# Patient Record
Sex: FEMALE | Race: BLACK OR AFRICAN AMERICAN | URBAN - METROPOLITAN AREA
[De-identification: names, ages, dates, MRNs, and addresses within clinical notes are randomized per-mention and may not be internally consistent; named-entity substitution may affect disease eponyms.]

---

## 2020-08-19 ENCOUNTER — APPOINTMENT (RX ONLY)
Dept: URBAN - METROPOLITAN AREA CLINIC 110 | Facility: CLINIC | Age: 65
Setting detail: DERMATOLOGY
End: 2020-08-19

## 2020-08-19 DIAGNOSIS — L21.8 OTHER SEBORRHEIC DERMATITIS: ICD-10-CM

## 2020-08-19 DIAGNOSIS — L30.9 DERMATITIS, UNSPECIFIED: ICD-10-CM

## 2020-08-19 PROCEDURE — ? COUNSELING

## 2020-08-19 PROCEDURE — 99202 OFFICE O/P NEW SF 15 MIN: CPT

## 2020-08-19 PROCEDURE — ? PRESCRIPTION

## 2020-08-19 RX ORDER — HYDROCORTISONE 25 MG/G
CREAM TOPICAL BID
Qty: 1 | Refills: 5 | Status: ERX | COMMUNITY
Start: 2020-08-19

## 2020-08-19 RX ORDER — KETOCONAZOLE 20 MG/ML
SHAMPOO, SUSPENSION TOPICAL TIW
Qty: 1 | Refills: 6 | Status: ERX | COMMUNITY
Start: 2020-08-19

## 2020-08-19 RX ADMIN — HYDROCORTISONE: 25 CREAM TOPICAL at 00:00

## 2020-08-19 RX ADMIN — KETOCONAZOLE: 20 SHAMPOO, SUSPENSION TOPICAL at 00:00

## 2020-08-19 ASSESSMENT — LOCATION SIMPLE DESCRIPTION DERM
LOCATION SIMPLE: SCALP
LOCATION SIMPLE: CHIN
LOCATION SIMPLE: LEFT ANTERIOR NECK
LOCATION SIMPLE: LEFT ANTERIOR NECK
LOCATION SIMPLE: SCALP

## 2020-08-19 ASSESSMENT — LOCATION ZONE DERM
LOCATION ZONE: SCALP
LOCATION ZONE: NECK
LOCATION ZONE: SCALP
LOCATION ZONE: FACE
LOCATION ZONE: NECK

## 2020-08-19 ASSESSMENT — LOCATION DETAILED DESCRIPTION DERM
LOCATION DETAILED: LEFT MENTAL CREASE
LOCATION DETAILED: LEFT INFERIOR ANTERIOR NECK
LOCATION DETAILED: RIGHT SUPERIOR PARIETAL SCALP
LOCATION DETAILED: LEFT INFERIOR ANTERIOR NECK
LOCATION DETAILED: LEFT SUPERIOR PARIETAL SCALP

## 2020-08-19 NOTE — PROCEDURE: MIPS QUALITY
Quality 127: Diabetic Foot And Ankle Care, Ulcer Prevention - Evaluation Of Footwear: Footwear Evaluation not Performed
Quality 130: Documentation Of Current Medications In The Medical Record: Current Medications Documented
Detail Level: Detailed
Quality 402: Tobacco Use And Help With Quitting Among Adolescents: Patient screened for tobacco and never smoked
Quality 431: Preventive Care And Screening: Unhealthy Alcohol Use - Screening: Patient screened for unhealthy alcohol use using a single question and scores less than 2 times per year
Quality 1: Diabetes Hemoglobin A1c Poor Control: Hemoglobin A1C was not recorded
Quality 126: Diabetes Mellitus: Diabetic Foot And Ankle Care, Peripheral Neuropathy - Neurological Evaluation: Lower Extremity Neurological Exam not Performed
Quality 226: Preventive Care And Screening: Tobacco Use: Screening And Cessation Intervention: Patient screened for tobacco use and is an ex/non-smoker

## 2020-08-19 NOTE — PROCEDURE: COUNSELING
Detail Level: Detailed
Patient Specific Counseling (Will Not Stick From Patient To Patient): .\\nWell-demarcated erythema and edema of the anterior neck and chin with associated itchiness. Reports beginning abruptly one week prior. Denies any new medications or pets, does endorse using a new shampoo for which she cannot remember the name 1 week prior to the onset of the rash. Instructed patient to stop using the shampoo. Will treat with hydrocortisone 2.5% at this time to be applied as instructed and follow up in 1 month. Encouraged her to call clinic if spreading or worsening of symptoms. All questions answered during the time of office visit.

## 2021-09-07 ENCOUNTER — APPOINTMENT (RX ONLY)
Dept: URBAN - METROPOLITAN AREA CLINIC 94 | Facility: CLINIC | Age: 66
Setting detail: DERMATOLOGY
End: 2021-09-07

## 2021-09-07 ENCOUNTER — RX ONLY (OUTPATIENT)
Age: 66
Setting detail: RX ONLY
End: 2021-09-07

## 2021-09-07 DIAGNOSIS — L91.8 OTHER HYPERTROPHIC DISORDERS OF THE SKIN: ICD-10-CM

## 2021-09-07 DIAGNOSIS — L85.3 XEROSIS CUTIS: ICD-10-CM

## 2021-09-07 DIAGNOSIS — D18.0 HEMANGIOMA: ICD-10-CM

## 2021-09-07 DIAGNOSIS — L82.0 INFLAMED SEBORRHEIC KERATOSIS: ICD-10-CM

## 2021-09-07 DIAGNOSIS — L81.5 LEUKODERMA, NOT ELSEWHERE CLASSIFIED: ICD-10-CM

## 2021-09-07 DIAGNOSIS — L81.4 OTHER MELANIN HYPERPIGMENTATION: ICD-10-CM

## 2021-09-07 DIAGNOSIS — Z71.89 OTHER SPECIFIED COUNSELING: ICD-10-CM

## 2021-09-07 DIAGNOSIS — L82.1 OTHER SEBORRHEIC KERATOSIS: ICD-10-CM

## 2021-09-07 DIAGNOSIS — D22 MELANOCYTIC NEVI: ICD-10-CM

## 2021-09-07 DIAGNOSIS — L21.8 OTHER SEBORRHEIC DERMATITIS: ICD-10-CM

## 2021-09-07 PROBLEM — D18.01 HEMANGIOMA OF SKIN AND SUBCUTANEOUS TISSUE: Status: ACTIVE | Noted: 2021-09-07

## 2021-09-07 PROBLEM — D22.5 MELANOCYTIC NEVI OF TRUNK: Status: ACTIVE | Noted: 2021-09-07

## 2021-09-07 PROCEDURE — ? PRESCRIPTION

## 2021-09-07 PROCEDURE — 17110 DESTRUCTION B9 LES UP TO 14: CPT

## 2021-09-07 PROCEDURE — ? FULL BODY SKIN EXAM

## 2021-09-07 PROCEDURE — ? LIQUID NITROGEN

## 2021-09-07 PROCEDURE — 99214 OFFICE O/P EST MOD 30 MIN: CPT | Mod: 25

## 2021-09-07 PROCEDURE — ? PRESCRIPTION MEDICATION MANAGEMENT

## 2021-09-07 PROCEDURE — ? COUNSELING

## 2021-09-07 RX ORDER — AMMONIUM LACTATE 12 G/100G
LOTION TOPICAL QD
Qty: 400 | Refills: 11 | Status: ERX | COMMUNITY
Start: 2021-09-07

## 2021-09-07 RX ORDER — KETOCONAZOLE 20 MG/G
CREAM TOPICAL BID
Qty: 60 | Refills: 9 | Status: ERX | COMMUNITY
Start: 2021-09-07

## 2021-09-07 RX ADMIN — AMMONIUM LACTATE: 12 LOTION TOPICAL at 00:00

## 2021-09-07 ASSESSMENT — LOCATION DETAILED DESCRIPTION DERM
LOCATION DETAILED: RIGHT CENTRAL MALAR CHEEK
LOCATION DETAILED: INFERIOR THORACIC SPINE
LOCATION DETAILED: LEFT PROXIMAL PRETIBIAL REGION
LOCATION DETAILED: RIGHT ANTERIOR PROXIMAL UPPER ARM
LOCATION DETAILED: PERIUMBILICAL SKIN
LOCATION DETAILED: LEFT MID-UPPER BACK
LOCATION DETAILED: RIGHT ANTERIOR MEDIAL PROXIMAL UPPER ARM
LOCATION DETAILED: RIGHT INFERIOR MEDIAL UPPER BACK
LOCATION DETAILED: RIGHT PROXIMAL PRETIBIAL REGION
LOCATION DETAILED: RIGHT SUPERIOR PARIETAL SCALP
LOCATION DETAILED: LEFT SUPERIOR PARIETAL SCALP

## 2021-09-07 ASSESSMENT — LOCATION SIMPLE DESCRIPTION DERM
LOCATION SIMPLE: UPPER BACK
LOCATION SIMPLE: SCALP
LOCATION SIMPLE: LEFT PRETIBIAL REGION
LOCATION SIMPLE: RIGHT PRETIBIAL REGION
LOCATION SIMPLE: SCALP
LOCATION SIMPLE: RIGHT CHEEK
LOCATION SIMPLE: RIGHT UPPER BACK
LOCATION SIMPLE: ABDOMEN
LOCATION SIMPLE: LEFT UPPER BACK
LOCATION SIMPLE: RIGHT UPPER ARM

## 2021-09-07 ASSESSMENT — LOCATION ZONE DERM
LOCATION ZONE: ARM
LOCATION ZONE: TRUNK
LOCATION ZONE: SCALP
LOCATION ZONE: SCALP
LOCATION ZONE: FACE
LOCATION ZONE: LEG

## 2021-09-07 NOTE — PROCEDURE: PRESCRIPTION MEDICATION MANAGEMENT
Continue Regimen: Ketoconazole 2% shampoo QD/TIW
Detail Level: Zone
Render In Strict Bullet Format?: No

## 2022-03-17 ENCOUNTER — APPOINTMENT (RX ONLY)
Dept: URBAN - METROPOLITAN AREA CLINIC 84 | Facility: CLINIC | Age: 67
Setting detail: DERMATOLOGY
End: 2022-03-17

## 2022-03-17 ENCOUNTER — RX ONLY (OUTPATIENT)
Age: 67
Setting detail: RX ONLY
End: 2022-03-17

## 2022-03-17 DIAGNOSIS — L81.4 OTHER MELANIN HYPERPIGMENTATION: ICD-10-CM

## 2022-03-17 DIAGNOSIS — L72.0 EPIDERMAL CYST: ICD-10-CM

## 2022-03-17 DIAGNOSIS — D22 MELANOCYTIC NEVI: ICD-10-CM

## 2022-03-17 DIAGNOSIS — L82.1 OTHER SEBORRHEIC KERATOSIS: ICD-10-CM

## 2022-03-17 DIAGNOSIS — L57.8 OTHER SKIN CHANGES DUE TO CHRONIC EXPOSURE TO NONIONIZING RADIATION: ICD-10-CM

## 2022-03-17 DIAGNOSIS — L81.6 OTHER DISORDERS OF DIMINISHED MELANIN FORMATION: ICD-10-CM

## 2022-03-17 DIAGNOSIS — L85.3 XEROSIS CUTIS: ICD-10-CM

## 2022-03-17 DIAGNOSIS — Z12.83 ENCOUNTER FOR SCREENING FOR MALIGNANT NEOPLASM OF SKIN: ICD-10-CM

## 2022-03-17 DIAGNOSIS — L21.8 OTHER SEBORRHEIC DERMATITIS: ICD-10-CM

## 2022-03-17 PROBLEM — D22.5 MELANOCYTIC NEVI OF TRUNK: Status: ACTIVE | Noted: 2022-03-17

## 2022-03-17 PROCEDURE — ? TREATMENT REGIMEN

## 2022-03-17 PROCEDURE — ? FULL BODY SKIN EXAM

## 2022-03-17 PROCEDURE — ? PRESCRIPTION MEDICATION MANAGEMENT

## 2022-03-17 PROCEDURE — ? COUNSELING

## 2022-03-17 PROCEDURE — ? PRESCRIPTION

## 2022-03-17 PROCEDURE — ? WOOD'S LAMP

## 2022-03-17 PROCEDURE — ? ADDITIONAL NOTES

## 2022-03-17 PROCEDURE — 99214 OFFICE O/P EST MOD 30 MIN: CPT

## 2022-03-17 PROCEDURE — ? SUNSCREEN RECOMMENDATIONS

## 2022-03-17 RX ORDER — KETOCONAZOLE 20 MG/ML
SHAMPOO, SUSPENSION TOPICAL
Qty: 1 | Refills: 11 | Status: CANCELLED | COMMUNITY
Start: 2022-03-17

## 2022-03-17 RX ORDER — KETOCONAZOLE 20 MG/ML
SHAMPOO, SUSPENSION TOPICAL
Qty: 120 | Refills: 11 | Status: ERX | COMMUNITY
Start: 2022-03-17

## 2022-03-17 RX ORDER — TACROLIMUS 1 MG/G
OINTMENT TOPICAL
Qty: 30 | Refills: 5 | Status: ERX | COMMUNITY
Start: 2022-03-17

## 2022-03-17 RX ADMIN — TACROLIMUS: 1 OINTMENT TOPICAL at 00:00

## 2022-03-17 RX ADMIN — KETOCONAZOLE: 20 SHAMPOO, SUSPENSION TOPICAL at 00:00

## 2022-03-17 ASSESSMENT — LOCATION ZONE DERM
LOCATION ZONE: LEG
LOCATION ZONE: SCALP
LOCATION ZONE: AXILLAE
LOCATION ZONE: LIP
LOCATION ZONE: ARM
LOCATION ZONE: SCALP
LOCATION ZONE: TRUNK

## 2022-03-17 ASSESSMENT — LOCATION SIMPLE DESCRIPTION DERM
LOCATION SIMPLE: UPPER BACK
LOCATION SIMPLE: RIGHT AXILLARY VAULT
LOCATION SIMPLE: RIGHT PRETIBIAL REGION
LOCATION SIMPLE: SCALP
LOCATION SIMPLE: RIGHT FOREARM
LOCATION SIMPLE: SCALP
LOCATION SIMPLE: RIGHT POSTERIOR UPPER ARM
LOCATION SIMPLE: LEFT PRETIBIAL REGION
LOCATION SIMPLE: RIGHT LIP

## 2022-03-17 ASSESSMENT — LOCATION DETAILED DESCRIPTION DERM
LOCATION DETAILED: INFERIOR THORACIC SPINE
LOCATION DETAILED: RIGHT PROXIMAL PRETIBIAL REGION
LOCATION DETAILED: RIGHT UPPER CUTANEOUS LIP
LOCATION DETAILED: LEFT PROXIMAL PRETIBIAL REGION
LOCATION DETAILED: RIGHT PROXIMAL DORSAL FOREARM
LOCATION DETAILED: RIGHT SUPERIOR PARIETAL SCALP
LOCATION DETAILED: LEFT SUPERIOR PARIETAL SCALP
LOCATION DETAILED: RIGHT AXILLARY VAULT
LOCATION DETAILED: RIGHT DISTAL POSTERIOR UPPER ARM
LOCATION DETAILED: SUPERIOR THORACIC SPINE

## 2022-03-17 NOTE — PROCEDURE: ADDITIONAL NOTES
Detail Level: Simple
Render Risk Assessment In Note?: no
Additional Notes: Pt has been using hydrocortisone 2.5% on face over the past year which fades the areas. On woods lamp, some pinpoint flourescence noted on exam, so will treat as PMH for now but consider biopsy in future if not improved. Switching to tacrolimus to avoid long term TCS to face

## 2022-03-17 NOTE — PROCEDURE: TREATMENT REGIMEN
Samples Given: Eucerin advanced formula
Detail Level: Zone
Otc Regimen: Benzoyl peroxide wash (PanOxyl)
Initiate Treatment: tacrolimus 0.1 % topical ointment